# Patient Record
(demographics unavailable — no encounter records)

---

## 2025-01-07 NOTE — PHYSICAL EXAM
[Tired appearing] : tired appearing [Wheezing] : wheezing [NL] : warm, clear [Lethargic] : not lethargic [Enlarged Tonsils] : tonsils not enlarged [Vesicles] : no vesicles [Exudate] : no exudate [Clear to Auscultation Bilaterally] : not clear to auscultation bilaterally [Crackles] : no crackles [Tachypnea] : no tachypnea

## 2025-01-07 NOTE — HISTORY OF PRESENT ILLNESS
[de-identified] : LOSS OF APPETITE, COUGH AND FATIGUE. [FreeTextEntry6] : 15 yr old F with T1DM and asthma presenting for 1 week of symptoms. +Increasing fatigue (did not leave bed yesterday), feeling hot and cold but no fevers, bad cough productive of mucous, and decreased appetite. Glucose has been stable. Used Albuterol twice yesterday for first time.

## 2025-01-07 NOTE — DISCUSSION/SUMMARY
[FreeTextEntry1] : 15 yr old F with T1DM and asthma presenting for URI symptoms. Weight loss with lack of appetite. Rapid Flu negative. Also w/ asthma exacerbation.  Plan: - Recommend supportive care including antipyretics, fluids, and humidifier/warm bath, then nasal saline followed by nasal suction. Education provided for signs of respiratory distress and dehydration.  - F/u RVP - C/w Albuterol at least BID - Return if symptoms worsen or persist.

## 2025-05-15 NOTE — ASSESSMENT
[FreeTextEntry1] : Kassy is a 15y 4m female with type 1 diabetes and excellent control. She is having elevated glucose levels after meals likely secondary to not administrating insulin prior to eating. Her programmed basal rate for manual mode provides too much insulin.   Plan - Change basal rate from 12-7am 2.05, 7-12am 3.05 to 2 units/hr all day.  - Give Humalog for food 20 minutes premeal unless blood glucose level is below target. - Obtain annual ophthalmology examination. - Obtain annual diabetes blood work. - Recommended influenza vaccine. - Follow up in 3 months.   Yanira Carey MD Pediatric Endocrinology [address], [address] [place] [address] [phone]

## 2025-05-15 NOTE — CONSULT LETTER
[Dear  ___] : Dear  [unfilled], [Courtesy Letter:] : I had the pleasure of seeing your patient, [unfilled], in my office today. [Please see my note below.] : Please see my note below. [Consult Closing:] : Thank you very much for allowing me to participate in the care of this patient.  If you have any questions, please do not hesitate to contact me. [Sincerely,] : Sincerely, [FreeTextEntry3] : Yanira Carey MD Pediatric Endocrinologist

## 2025-05-15 NOTE — THERAPY
[Today's Date] : [unfilled] [Humalog] : Humalog [_____] :  [unfilled] units/hour [Carbohydrate Ratio:                  1 unit for every ___ grams of carbohydrates] : Carbohydrate Ratio: 1 unit for every [unfilled] grams of carbohydrates [BG Target = ____] : BG Target = [unfilled] [Insulin Sensitivity Factor = ____] : Insulin Sensitivity Factor = [unfilled] [Insulin on Board (IOB) Duration = ____ hours] : Insulin on Board (IOB) Duration  = [unfilled] hours [FreeTextEntry6] : Omnipod 5 and DexCom G6

## 2025-05-15 NOTE — DATA REVIEWED
[FreeTextEntry1] : Amelia report from 5/2-5/15/2025 : Basal 55%, bolus 45%. Daily dose 47.1  Automated mode 100%. AGP: Very low 0%, low 2%, target range 77%, high 20%, very high 1%.  Average 146 mg/dl. Coefficient of variation 30.7%.

## 2025-05-15 NOTE — PHYSICAL EXAM
[Healthy Appearing] : healthy appearing [Well Nourished] : well nourished [Interactive] : interactive [Normal Appearance] : normal appearance [Well formed] : well formed [Normally Set] : normally set [Normal S1 and S2] : normal S1 and S2 [Murmur] : no murmurs [Clear to Ausculation Bilaterally] : clear to auscultation bilaterally [Abdomen Soft] : soft [Abdomen Tenderness] : non-tender [] : no hepatosplenomegaly [Normal] : normal  [de-identified] : Injection sites satisfactory.

## 2025-05-15 NOTE — HISTORY OF PRESENT ILLNESS
[Other: ___] :  blood sugar levels are tested [unfilled] times per day [3] : the pump insertion site is changed every 3 days [Abdomen] : abdomen [Buttocks] : buttocks [Glucagon at Home] : has glucagon at home [Previous Hypoglycemic Seizure] : has no history of hypoglycemic seizure [FreeTextEntry2] : Kassy is a 15y 4m female here for follow up of type 1 diabetes.  Kassy was diagnosed with type 1 diabetes in 7/2017, not in DKA. Islet cell and insulin antibodies were positive.   - Father reports that KASSY's glucose levels have been elevated after meals which he believes is due to Kassy not giving insulin before eating.  -  Kassy reports occasional hypoglycemia and the severity is mild to moderate. -  Kassy denies polyuria, polydipsia, and nocturia. -  Hospitalizations or ER visits since last visit: none. -  Diet: Carb counting. -  Date of last ophthalmology exam: Due. -  Influenza vaccine up to date: Out of season. -  Missed days of school/work/camp secondary to diabetes: None. -  Problems at school/work/camp related to diabetes: None. -  Kassy is active in dance.   Insulin regimen prior to today's visit: Omnipod 5 basal 12-7am 2.05, 7am-12am 3.05 units/hr ICR 5 ISF 12-7am 35, 7am-3pm 28, 3pm-12am 22 Target 110   The remainder of the diabetes therapy is as follows: [FreeTextEntry1] : Menarche at 13 years. Menses regular.